# Patient Record
Sex: FEMALE | Race: WHITE | NOT HISPANIC OR LATINO | URBAN - METROPOLITAN AREA
[De-identification: names, ages, dates, MRNs, and addresses within clinical notes are randomized per-mention and may not be internally consistent; named-entity substitution may affect disease eponyms.]

---

## 2017-02-08 ENCOUNTER — OUTPATIENT (OUTPATIENT)
Dept: OUTPATIENT SERVICES | Facility: HOSPITAL | Age: 62
LOS: 1 days | End: 2017-02-08
Payer: COMMERCIAL

## 2017-02-08 VITALS
SYSTOLIC BLOOD PRESSURE: 131 MMHG | HEIGHT: 65.5 IN | RESPIRATION RATE: 13 BRPM | DIASTOLIC BLOOD PRESSURE: 73 MMHG | TEMPERATURE: 99 F | WEIGHT: 175.93 LBS | OXYGEN SATURATION: 100 % | HEART RATE: 74 BPM

## 2017-02-08 VITALS
DIASTOLIC BLOOD PRESSURE: 72 MMHG | RESPIRATION RATE: 16 BRPM | HEART RATE: 90 BPM | OXYGEN SATURATION: 100 % | SYSTOLIC BLOOD PRESSURE: 124 MMHG

## 2017-02-08 DIAGNOSIS — Z90.89 ACQUIRED ABSENCE OF OTHER ORGANS: Chronic | ICD-10-CM

## 2017-02-08 DIAGNOSIS — H35.341 MACULAR CYST, HOLE, OR PSEUDOHOLE, RIGHT EYE: ICD-10-CM

## 2017-02-08 DIAGNOSIS — Z87.2 PERSONAL HISTORY OF DISEASES OF THE SKIN AND SUBCUTANEOUS TISSUE: Chronic | ICD-10-CM

## 2017-02-08 PROCEDURE — C1889: CPT

## 2017-02-08 PROCEDURE — 67042 VIT FOR MACULAR HOLE: CPT | Mod: RT

## 2017-02-08 NOTE — ASU DISCHARGE PLAN (ADULT/PEDIATRIC). - NOTIFY
Persistent Nausea and Vomiting/Bleeding that does not stop/Pain not relieved by Medications/Fever greater than 101 Swelling that continues/Persistent Nausea and Vomiting/Pain not relieved by Medications/Bleeding that does not stop/Fever greater than 101

## 2023-01-12 ENCOUNTER — OFFICE (OUTPATIENT)
Dept: URBAN - METROPOLITAN AREA CLINIC 100 | Facility: CLINIC | Age: 68
Setting detail: OPHTHALMOLOGY
End: 2023-01-12
Payer: COMMERCIAL

## 2023-01-12 DIAGNOSIS — H35.373: ICD-10-CM

## 2023-01-12 DIAGNOSIS — H25.12: ICD-10-CM

## 2023-01-12 DIAGNOSIS — H02.834: ICD-10-CM

## 2023-01-12 DIAGNOSIS — H35.341: ICD-10-CM

## 2023-01-12 DIAGNOSIS — H40.1131: ICD-10-CM

## 2023-01-12 DIAGNOSIS — H43.812: ICD-10-CM

## 2023-01-12 DIAGNOSIS — H02.831: ICD-10-CM

## 2023-01-12 DIAGNOSIS — Z96.1: ICD-10-CM

## 2023-01-12 DIAGNOSIS — H02.403: ICD-10-CM

## 2023-01-12 PROCEDURE — 92250 FUNDUS PHOTOGRAPHY W/I&R: CPT | Performed by: OPHTHALMOLOGY

## 2023-01-12 PROCEDURE — 99214 OFFICE O/P EST MOD 30 MIN: CPT | Performed by: OPHTHALMOLOGY

## 2023-01-12 ASSESSMENT — CONFRONTATIONAL VISUAL FIELD TEST (CVF)
OD_FINDINGS: FULL
OS_FINDINGS: FULL

## 2023-01-12 ASSESSMENT — REFRACTION_MANIFEST
OD_CYLINDER: -1.25
OD_SPHERE: -1.00
OD_AXIS: 070
OS_AXIS: 110
OS_SPHERE: -1.00
OS_CYLINDER: -1.75

## 2023-01-12 ASSESSMENT — REFRACTION_CURRENTRX
OS_CYLINDER: -1.25
OD_AXIS: 074
OD_CYLINDER: -1.25
OD_OVR_VA: 20/
OS_OVR_VA: 20/
OS_ADD: +2.50
OD_ADD: +2.50
OD_SPHERE: -1.00
OS_AXIS: 098
OS_SPHERE: -1.25
OS_VPRISM_DIRECTION: PROGS
OD_VPRISM_DIRECTION: PROGS

## 2023-01-12 ASSESSMENT — REFRACTION_AUTOREFRACTION
OS_SPHERE: -1.00
OD_AXIS: 071
OD_CYLINDER: -1.25
OS_AXIS: 108
OS_CYLINDER: -1.75
OD_SPHERE: -1.00

## 2023-01-12 ASSESSMENT — LID POSITION - DERMATOCHALASIS
OS_DERMATOCHALASIS: LUL
OD_DERMATOCHALASIS: RUL

## 2023-01-12 ASSESSMENT — KERATOMETRY
OS_K1POWER_DIOPTERS: 44.25
OD_AXISANGLE_DEGREES: 148
OS_AXISANGLE_DEGREES: 030
OD_K1POWER_DIOPTERS: 45.00
METHOD_AUTO_MANUAL: AUTO
OD_K2POWER_DIOPTERS: 45.50
OS_K2POWER_DIOPTERS: 45.25

## 2023-01-12 ASSESSMENT — VISUAL ACUITY
OD_BCVA: 20/30-2
OS_BCVA: 20/30-2

## 2023-01-12 ASSESSMENT — SPHEQUIV_DERIVED
OS_SPHEQUIV: -1.875
OD_SPHEQUIV: -1.625
OS_SPHEQUIV: -1.875
OD_SPHEQUIV: -1.625

## 2023-01-12 ASSESSMENT — AXIALLENGTH_DERIVED
OD_AL: 23.5822
OS_AL: 23.8664
OD_AL: 23.5822
OS_AL: 23.8664

## 2023-01-12 ASSESSMENT — LID POSITION - PTOSIS
OS_PTOSIS: LUL
OD_PTOSIS: RUL

## 2023-01-12 ASSESSMENT — TONOMETRY
OS_IOP_MMHG: 18
OD_IOP_MMHG: 18

## 2023-05-18 ENCOUNTER — OFFICE (OUTPATIENT)
Dept: URBAN - METROPOLITAN AREA CLINIC 12 | Facility: CLINIC | Age: 68
Setting detail: OPHTHALMOLOGY
End: 2023-05-18

## 2023-05-18 ENCOUNTER — OFFICE (OUTPATIENT)
Dept: URBAN - METROPOLITAN AREA CLINIC 12 | Facility: CLINIC | Age: 68
Setting detail: OPHTHALMOLOGY
End: 2023-05-18
Payer: COMMERCIAL

## 2023-05-18 DIAGNOSIS — Z96.1: ICD-10-CM

## 2023-05-18 DIAGNOSIS — H40.1131: ICD-10-CM

## 2023-05-18 DIAGNOSIS — H02.834: ICD-10-CM

## 2023-05-18 DIAGNOSIS — H02.403: ICD-10-CM

## 2023-05-18 DIAGNOSIS — H02.831: ICD-10-CM

## 2023-05-18 DIAGNOSIS — H61.23: ICD-10-CM

## 2023-05-18 DIAGNOSIS — H25.12: ICD-10-CM

## 2023-05-18 PROCEDURE — 99214 OFFICE O/P EST MOD 30 MIN: CPT | Performed by: OPHTHALMOLOGY

## 2023-05-18 PROCEDURE — DNT-WAX DNT-WAX

## 2023-05-18 ASSESSMENT — REFRACTION_AUTOREFRACTION
OD_SPHERE: -1.25
OS_AXIS: 100
OD_CYLINDER: -1.00
OS_SPHERE: -1.25
OD_AXIS: 082
OS_CYLINDER: -1.50

## 2023-05-18 ASSESSMENT — REFRACTION_CURRENTRX
OS_AXIS: 091
OS_SPHERE: -1.25
OD_AXIS: 081
OS_OVR_VA: 20/
OS_CYLINDER: -1.25
OD_ADD: +2.50
OD_CYLINDER: -1.25
OS_ADD: +2.50
OD_VPRISM_DIRECTION: PROGS
OS_VPRISM_DIRECTION: PROGS
OD_OVR_VA: 20/
OD_SPHERE: -0.75

## 2023-05-18 ASSESSMENT — REFRACTION_MANIFEST
OD_AXIS: 070
OS_CYLINDER: -1.75
OD_CYLINDER: -1.25
OS_SPHERE: -1.00
OS_AXIS: 110
OD_SPHERE: -1.00

## 2023-05-18 ASSESSMENT — VISUAL ACUITY
OD_BCVA: 20/30+2
OS_BCVA: 20/40-1

## 2023-05-18 ASSESSMENT — KERATOMETRY
OD_K2POWER_DIOPTERS: 45.50
OS_AXISANGLE_DEGREES: 020
OD_AXISANGLE_DEGREES: 153
OS_K2POWER_DIOPTERS: 45.25
METHOD_AUTO_MANUAL: AUTO
OD_K1POWER_DIOPTERS: 45.00
OS_K1POWER_DIOPTERS: 44.25

## 2023-05-18 ASSESSMENT — AXIALLENGTH_DERIVED
OS_AL: 23.9164
OD_AL: 23.5822
OS_AL: 23.8664
OD_AL: 23.631

## 2023-05-18 ASSESSMENT — TONOMETRY
OS_IOP_MMHG: 16
OD_IOP_MMHG: 17

## 2023-05-18 ASSESSMENT — CONFRONTATIONAL VISUAL FIELD TEST (CVF)
OS_FINDINGS: FULL
OD_FINDINGS: FULL

## 2023-05-18 ASSESSMENT — SPHEQUIV_DERIVED
OS_SPHEQUIV: -2
OS_SPHEQUIV: -1.875
OD_SPHEQUIV: -1.625
OD_SPHEQUIV: -1.75

## 2023-05-18 ASSESSMENT — LID POSITION - PTOSIS
OS_PTOSIS: LUL
OD_PTOSIS: RUL

## 2023-05-18 ASSESSMENT — LID POSITION - DERMATOCHALASIS
OS_DERMATOCHALASIS: LUL
OD_DERMATOCHALASIS: RUL

## 2023-06-07 ENCOUNTER — OFFICE (OUTPATIENT)
Dept: URBAN - METROPOLITAN AREA CLINIC 12 | Facility: CLINIC | Age: 68
Setting detail: OPHTHALMOLOGY
End: 2023-06-07
Payer: COMMERCIAL

## 2023-06-07 DIAGNOSIS — H90.3: ICD-10-CM

## 2023-06-07 PROCEDURE — 92567 TYMPANOMETRY: CPT

## 2023-06-07 PROCEDURE — 92557 COMPREHENSIVE HEARING TEST: CPT

## 2023-11-04 ENCOUNTER — OFFICE (OUTPATIENT)
Dept: URBAN - METROPOLITAN AREA CLINIC 100 | Facility: CLINIC | Age: 68
Setting detail: OPHTHALMOLOGY
End: 2023-11-04
Payer: MEDICARE

## 2023-11-04 DIAGNOSIS — H40.1131: ICD-10-CM

## 2023-11-04 DIAGNOSIS — H02.834: ICD-10-CM

## 2023-11-04 DIAGNOSIS — H25.12: ICD-10-CM

## 2023-11-04 DIAGNOSIS — H02.403: ICD-10-CM

## 2023-11-04 DIAGNOSIS — Z96.1: ICD-10-CM

## 2023-11-04 DIAGNOSIS — H02.831: ICD-10-CM

## 2023-11-04 PROCEDURE — 99214 OFFICE O/P EST MOD 30 MIN: CPT | Performed by: OPHTHALMOLOGY

## 2023-11-04 PROCEDURE — 92083 EXTENDED VISUAL FIELD XM: CPT | Performed by: OPHTHALMOLOGY

## 2023-11-04 PROCEDURE — 92133 CPTRZD OPH DX IMG PST SGM ON: CPT | Performed by: OPHTHALMOLOGY

## 2023-11-04 ASSESSMENT — REFRACTION_MANIFEST
OS_CYLINDER: -1.75
OS_SPHERE: -1.25
OD_SPHERE: -1.00
OD_CYLINDER: -1.50
OS_CYLINDER: -1.75
OD_AXIS: 070
OS_AXIS: 100
OS_SPHERE: -1.00
OD_VA1: 20/NA
OD_AXIS: 80
OD_SPHERE: -1.00
OS_AXIS: 110
OS_VA1: 20/20-1
OD_CYLINDER: -1.25

## 2023-11-04 ASSESSMENT — SPHEQUIV_DERIVED
OS_SPHEQUIV: -1.875
OD_SPHEQUIV: -1.75
OD_SPHEQUIV: -1.75
OS_SPHEQUIV: -2.125
OS_SPHEQUIV: -1.875
OD_SPHEQUIV: -1.625

## 2023-11-04 ASSESSMENT — REFRACTION_CURRENTRX
OS_ADD: +2.50
OD_CYLINDER: -1.25
OD_VPRISM_DIRECTION: PROGS
OS_AXIS: 091
OS_CYLINDER: -1.25
OD_SPHERE: -0.75
OS_SPHERE: -1.25
OS_VPRISM_DIRECTION: PROGS
OS_OVR_VA: 20/
OD_OVR_VA: 20/
OD_ADD: +2.50
OD_AXIS: 081

## 2023-11-04 ASSESSMENT — LID POSITION - PTOSIS
OS_PTOSIS: LUL
OD_PTOSIS: RUL

## 2023-11-04 ASSESSMENT — REFRACTION_AUTOREFRACTION
OS_SPHERE: -1.00
OD_SPHERE: -1.00
OS_CYLINDER: -1.75
OS_AXIS: 98
OD_AXIS: 77
OD_CYLINDER: -1.50

## 2023-11-04 ASSESSMENT — CONFRONTATIONAL VISUAL FIELD TEST (CVF)
OS_FINDINGS: FULL
OD_FINDINGS: FULL

## 2023-11-04 ASSESSMENT — LID POSITION - DERMATOCHALASIS
OD_DERMATOCHALASIS: RUL
OS_DERMATOCHALASIS: LUL

## 2023-11-30 NOTE — ASU DISCHARGE PLAN (ADULT/PEDIATRIC). - PT EDUC
4 Hour(s) 8 Minute(s) other (specify)/[employer] card, green bracelet on pt, Eye shield with instructions , sunglasses and eye kit given to patient.

## 2024-03-04 ENCOUNTER — OFFICE (OUTPATIENT)
Dept: URBAN - METROPOLITAN AREA CLINIC 100 | Facility: CLINIC | Age: 69
Setting detail: OPHTHALMOLOGY
End: 2024-03-04
Payer: MEDICARE

## 2024-03-04 DIAGNOSIS — H25.12: ICD-10-CM

## 2024-03-04 DIAGNOSIS — H35.373: ICD-10-CM

## 2024-03-04 DIAGNOSIS — H40.1131: ICD-10-CM

## 2024-03-04 DIAGNOSIS — H35.341: ICD-10-CM

## 2024-03-04 PROCEDURE — 92014 COMPRE OPH EXAM EST PT 1/>: CPT | Performed by: OPHTHALMOLOGY

## 2024-03-04 PROCEDURE — 92250 FUNDUS PHOTOGRAPHY W/I&R: CPT | Performed by: OPHTHALMOLOGY

## 2024-03-04 ASSESSMENT — REFRACTION_CURRENTRX
OS_ADD: +2.50
OS_OVR_VA: 20/
OD_SPHERE: -0.75
OD_OVR_VA: 20/
OS_SPHERE: -1.25
OD_CYLINDER: -1.25
OD_AXIS: 080
OS_VPRISM_DIRECTION: PROGS
OS_CYLINDER: -1.25
OD_VPRISM_DIRECTION: PROGS
OD_ADD: +2.50
OS_AXIS: 100

## 2024-03-04 ASSESSMENT — REFRACTION_MANIFEST
OS_AXIS: 105
OD_AXIS: 075
OS_AXIS: 110
OS_SPHERE: -1.00
OS_VA1: 20/25-
OD_SPHERE: -1.00
OS_CYLINDER: -1.75
OS_CYLINDER: -1.75
OD_AXIS: 070
OD_CYLINDER: -1.25
OS_SPHERE: -1.00
OU_VA: 20/25-
OD_CYLINDER: -1.00
OD_VA1: 20/40
OD_SPHERE: -1.00

## 2024-03-04 ASSESSMENT — LID POSITION - DERMATOCHALASIS
OS_DERMATOCHALASIS: LUL
OD_DERMATOCHALASIS: RUL

## 2024-03-04 ASSESSMENT — LID POSITION - PTOSIS
OD_PTOSIS: RUL
OS_PTOSIS: LUL

## 2024-03-04 ASSESSMENT — SPHEQUIV_DERIVED
OS_SPHEQUIV: -1.875
OD_SPHEQUIV: -1.625
OS_SPHEQUIV: -1.875
OD_SPHEQUIV: -1.5

## 2024-07-10 ENCOUNTER — OFFICE (OUTPATIENT)
Dept: URBAN - METROPOLITAN AREA CLINIC 100 | Facility: CLINIC | Age: 69
Setting detail: OPHTHALMOLOGY
End: 2024-07-10
Payer: MEDICARE

## 2024-07-10 DIAGNOSIS — H02.403: ICD-10-CM

## 2024-07-10 DIAGNOSIS — H02.834: ICD-10-CM

## 2024-07-10 DIAGNOSIS — H40.1131: ICD-10-CM

## 2024-07-10 DIAGNOSIS — Z96.1: ICD-10-CM

## 2024-07-10 DIAGNOSIS — H25.12: ICD-10-CM

## 2024-07-10 DIAGNOSIS — H02.831: ICD-10-CM

## 2024-07-10 PROCEDURE — 99214 OFFICE O/P EST MOD 30 MIN: CPT | Performed by: OPHTHALMOLOGY

## 2024-07-10 ASSESSMENT — LID POSITION - DERMATOCHALASIS
OS_DERMATOCHALASIS: LUL
OD_DERMATOCHALASIS: RUL

## 2024-07-10 ASSESSMENT — CONFRONTATIONAL VISUAL FIELD TEST (CVF)
OS_FINDINGS: FULL
OD_FINDINGS: FULL

## 2024-07-10 ASSESSMENT — LID POSITION - PTOSIS
OS_PTOSIS: LUL
OD_PTOSIS: RUL

## 2024-11-18 ENCOUNTER — OFFICE (OUTPATIENT)
Dept: URBAN - METROPOLITAN AREA CLINIC 100 | Facility: CLINIC | Age: 69
Setting detail: OPHTHALMOLOGY
End: 2024-11-18
Payer: MEDICARE

## 2024-11-18 ENCOUNTER — RX ONLY (RX ONLY)
Age: 69
End: 2024-11-18

## 2024-11-18 DIAGNOSIS — H02.834: ICD-10-CM

## 2024-11-18 DIAGNOSIS — H25.12: ICD-10-CM

## 2024-11-18 DIAGNOSIS — H02.831: ICD-10-CM

## 2024-11-18 DIAGNOSIS — H01.004: ICD-10-CM

## 2024-11-18 DIAGNOSIS — Z96.1: ICD-10-CM

## 2024-11-18 DIAGNOSIS — H40.1131: ICD-10-CM

## 2024-11-18 DIAGNOSIS — H01.001: ICD-10-CM

## 2024-11-18 DIAGNOSIS — H02.403: ICD-10-CM

## 2024-11-18 PROCEDURE — 92083 EXTENDED VISUAL FIELD XM: CPT | Performed by: OPHTHALMOLOGY

## 2024-11-18 PROCEDURE — 99213 OFFICE O/P EST LOW 20 MIN: CPT | Performed by: OPHTHALMOLOGY

## 2024-11-18 PROCEDURE — 92133 CPTRZD OPH DX IMG PST SGM ON: CPT | Performed by: OPHTHALMOLOGY

## 2024-11-18 ASSESSMENT — CONFRONTATIONAL VISUAL FIELD TEST (CVF)
OS_FINDINGS: FULL
OD_FINDINGS: FULL

## 2024-11-18 ASSESSMENT — KERATOMETRY
OS_K2POWER_DIOPTERS: 45.75
OD_AXISANGLE_DEGREES: 162
METHOD_AUTO_MANUAL: AUTO
OS_K1POWER_DIOPTERS: 44.00
OD_K1POWER_DIOPTERS: 45.00
OD_K2POWER_DIOPTERS: 45.50
OS_AXISANGLE_DEGREES: 014

## 2024-11-18 ASSESSMENT — REFRACTION_MANIFEST
OS_AXIS: 105
OS_AXIS: 100
OU_VA: 20/25-
OD_SPHERE: -1.50
OD_SPHERE: -1.00
OD_AXIS: 075
OS_AXIS: 110
OD_CYLINDER: -1.25
OS_SPHERE: -1.25
OS_SPHERE: -1.00
OD_CYLINDER: -1.00
OD_AXIS: 070
OS_CYLINDER: -1.75
OD_VA1: 20/40
OD_SPHERE: -1.00
OS_CYLINDER: -2.00
OS_CYLINDER: -1.75
OD_AXIS: 075
OD_CYLINDER: -1.25
OS_VA1: 20/25-
OS_SPHERE: -1.00

## 2024-11-18 ASSESSMENT — REFRACTION_CURRENTRX
OD_SPHERE: -0.75
OS_AXIS: 098
OD_OVR_VA: 20/
OS_SPHERE: -1.25
OD_AXIS: 080
OS_OVR_VA: 20/
OS_VPRISM_DIRECTION: PROGS
OS_CYLINDER: -1.50
OD_VPRISM_DIRECTION: PROGS
OD_CYLINDER: -1.25
OS_ADD: +2.50
OD_ADD: +2.50

## 2024-11-18 ASSESSMENT — REFRACTION_AUTOREFRACTION
OS_CYLINDER: -1.75
OD_CYLINDER: -1.25
OD_SPHERE: -1.25
OS_AXIS: 100
OS_SPHERE: -1.25
OD_AXIS: 076

## 2024-11-18 ASSESSMENT — LID EXAM ASSESSMENTS
OS_BLEPHARITIS: LUL 1+ 2+
OD_BLEPHARITIS: RUL 1+ 2+

## 2024-11-18 ASSESSMENT — LID POSITION - DERMATOCHALASIS
OD_DERMATOCHALASIS: RUL
OS_DERMATOCHALASIS: LUL

## 2024-11-18 ASSESSMENT — TONOMETRY
OD_IOP_MMHG: 20
OS_IOP_MMHG: 18

## 2024-11-18 ASSESSMENT — VISUAL ACUITY
OS_BCVA: 20/40-2
OD_BCVA: 20/25-3

## 2024-11-18 ASSESSMENT — LID POSITION - PTOSIS
OS_PTOSIS: LUL
OD_PTOSIS: RUL

## 2025-03-13 ENCOUNTER — OFFICE (OUTPATIENT)
Dept: URBAN - METROPOLITAN AREA CLINIC 100 | Facility: CLINIC | Age: 70
Setting detail: OPHTHALMOLOGY
End: 2025-03-13
Payer: MEDICARE

## 2025-03-13 DIAGNOSIS — H25.12: ICD-10-CM

## 2025-03-13 DIAGNOSIS — H40.1131: ICD-10-CM

## 2025-03-13 DIAGNOSIS — Z96.1: ICD-10-CM

## 2025-03-13 DIAGNOSIS — H02.403: ICD-10-CM

## 2025-03-13 DIAGNOSIS — H01.004: ICD-10-CM

## 2025-03-13 DIAGNOSIS — H35.373: ICD-10-CM

## 2025-03-13 DIAGNOSIS — H01.001: ICD-10-CM

## 2025-03-13 DIAGNOSIS — H35.341: ICD-10-CM

## 2025-03-13 DIAGNOSIS — H02.834: ICD-10-CM

## 2025-03-13 DIAGNOSIS — H43.812: ICD-10-CM

## 2025-03-13 DIAGNOSIS — H02.831: ICD-10-CM

## 2025-03-13 PROCEDURE — 92250 FUNDUS PHOTOGRAPHY W/I&R: CPT | Performed by: OPHTHALMOLOGY

## 2025-03-13 PROCEDURE — 99214 OFFICE O/P EST MOD 30 MIN: CPT | Performed by: OPHTHALMOLOGY

## 2025-03-13 ASSESSMENT — REFRACTION_MANIFEST
OS_SPHERE: -1.25
OS_AXIS: 105
OS_CYLINDER: -1.75
OD_CYLINDER: -1.00
OS_VA1: 20/25-
OD_VA1: 20/40
OS_SPHERE: -1.00
OS_AXIS: 100
OS_CYLINDER: -2.00
OD_AXIS: 075
OU_VA: 20/25-
OD_CYLINDER: -1.25
OS_AXIS: 110
OD_CYLINDER: -1.25
OD_AXIS: 070
OD_SPHERE: -1.50
OD_SPHERE: -1.00
OD_SPHERE: -1.00
OS_SPHERE: -1.00
OD_AXIS: 075
OS_CYLINDER: -1.75

## 2025-03-13 ASSESSMENT — LID EXAM ASSESSMENTS
OD_BLEPHARITIS: RUL 1+ 2+
OS_BLEPHARITIS: LUL 1+ 2+

## 2025-03-13 ASSESSMENT — REFRACTION_AUTOREFRACTION
OS_SPHERE: -0.75
OD_SPHERE: -1.25
OD_CYLINDER: -1.25
OS_AXIS: 093
OS_CYLINDER: -1.75
OD_AXIS: 082

## 2025-03-13 ASSESSMENT — REFRACTION_CURRENTRX
OS_AXIS: 099
OS_OVR_VA: 20/
OS_SPHERE: -1.25
OD_ADD: +2.50
OD_VPRISM_DIRECTION: PROGS
OD_OVR_VA: 20/
OS_ADD: +2.50
OD_SPHERE: -0.75
OD_AXIS: 082
OD_CYLINDER: -1.25
OS_VPRISM_DIRECTION: PROGS
OS_CYLINDER: -1.25

## 2025-03-13 ASSESSMENT — CONFRONTATIONAL VISUAL FIELD TEST (CVF)
OS_FINDINGS: FULL
OD_FINDINGS: FULL

## 2025-03-13 ASSESSMENT — TONOMETRY
OD_IOP_MMHG: 18
OS_IOP_MMHG: 18

## 2025-03-13 ASSESSMENT — KERATOMETRY
METHOD_AUTO_MANUAL: AUTO
OS_AXISANGLE_DEGREES: 014
OD_K1POWER_DIOPTERS: 45.00
OS_K2POWER_DIOPTERS: 45.75
OD_K2POWER_DIOPTERS: 45.50
OD_AXISANGLE_DEGREES: 162
OS_K1POWER_DIOPTERS: 44.00

## 2025-03-13 ASSESSMENT — VISUAL ACUITY
OD_BCVA: 20/20-1
OS_BCVA: 20/30-1

## 2025-03-13 ASSESSMENT — LID POSITION - DERMATOCHALASIS
OD_DERMATOCHALASIS: RUL
OS_DERMATOCHALASIS: LUL

## 2025-03-13 ASSESSMENT — LID POSITION - PTOSIS
OD_PTOSIS: RUL
OS_PTOSIS: LUL

## 2025-07-10 ENCOUNTER — OFFICE (OUTPATIENT)
Dept: URBAN - METROPOLITAN AREA CLINIC 100 | Facility: CLINIC | Age: 70
Setting detail: OPHTHALMOLOGY
End: 2025-07-10
Payer: MEDICARE

## 2025-07-10 DIAGNOSIS — H02.834: ICD-10-CM

## 2025-07-10 DIAGNOSIS — H25.12: ICD-10-CM

## 2025-07-10 DIAGNOSIS — H01.001: ICD-10-CM

## 2025-07-10 DIAGNOSIS — H02.831: ICD-10-CM

## 2025-07-10 DIAGNOSIS — H40.1131: ICD-10-CM

## 2025-07-10 DIAGNOSIS — H02.403: ICD-10-CM

## 2025-07-10 DIAGNOSIS — Z96.1: ICD-10-CM

## 2025-07-10 DIAGNOSIS — H01.004: ICD-10-CM

## 2025-07-10 PROCEDURE — 99213 OFFICE O/P EST LOW 20 MIN: CPT | Performed by: OPHTHALMOLOGY

## 2025-07-10 ASSESSMENT — LID POSITION - PTOSIS
OS_PTOSIS: LUL
OD_PTOSIS: RUL

## 2025-07-10 ASSESSMENT — KERATOMETRY
OD_K2POWER_DIOPTERS: 45.75
OD_AXISANGLE_DEGREES: 157
OD_K1POWER_DIOPTERS: 45.00
METHOD_AUTO_MANUAL: AUTO
OS_K2POWER_DIOPTERS: 45.50
OS_K1POWER_DIOPTERS: 44.25
OS_AXISANGLE_DEGREES: 008

## 2025-07-10 ASSESSMENT — REFRACTION_CURRENTRX
OD_OVR_VA: 20/
OD_ADD: +2.50
OD_VPRISM_DIRECTION: PROGS
OS_VPRISM_DIRECTION: PROGS
OD_CYLINDER: -1.25
OS_CYLINDER: -1.25
OS_OVR_VA: 20/
OD_AXIS: 079
OD_SPHERE: -0.75
OS_SPHERE: -1.25
OS_ADD: +2.50
OS_AXIS: 095

## 2025-07-10 ASSESSMENT — REFRACTION_MANIFEST
OS_AXIS: 105
OD_VA1: 20/40
OD_SPHERE: -1.00
OD_CYLINDER: -1.25
OD_AXIS: 075
OD_SPHERE: -1.00
OS_SPHERE: -1.00
OU_VA: 20/25-
OS_CYLINDER: -2.00
OS_AXIS: 100
OS_AXIS: 110
OD_AXIS: 075
OS_CYLINDER: -1.75
OD_CYLINDER: -1.00
OD_CYLINDER: -1.25
OD_AXIS: 070
OS_SPHERE: -1.25
OD_SPHERE: -1.50
OS_SPHERE: -1.00
OS_CYLINDER: -1.75
OS_VA1: 20/25-

## 2025-07-10 ASSESSMENT — LID POSITION - DERMATOCHALASIS
OD_DERMATOCHALASIS: RUL
OS_DERMATOCHALASIS: LUL

## 2025-07-10 ASSESSMENT — REFRACTION_AUTOREFRACTION
OD_SPHERE: -1.50
OD_CYLINDER: -1.25
OD_AXIS: 079
OS_SPHERE: -1.25
OS_CYLINDER: -1.75
OS_AXIS: 98

## 2025-07-10 ASSESSMENT — CONFRONTATIONAL VISUAL FIELD TEST (CVF)
OS_FINDINGS: FULL
OD_FINDINGS: FULL

## 2025-07-10 ASSESSMENT — TONOMETRY
OD_IOP_MMHG: 16
OS_IOP_MMHG: 16

## 2025-07-10 ASSESSMENT — LID EXAM ASSESSMENTS
OD_BLEPHARITIS: RUL 1+ 2+
OS_BLEPHARITIS: LUL 1+ 2+

## 2025-07-10 ASSESSMENT — VISUAL ACUITY
OD_BCVA: 20/40+
OS_BCVA: 20/30